# Patient Record
Sex: MALE | Race: ASIAN | NOT HISPANIC OR LATINO | Employment: STUDENT | URBAN - METROPOLITAN AREA
[De-identification: names, ages, dates, MRNs, and addresses within clinical notes are randomized per-mention and may not be internally consistent; named-entity substitution may affect disease eponyms.]

---

## 2017-11-20 ENCOUNTER — OFFICE VISIT (OUTPATIENT)
Dept: ENDOCRINOLOGY | Facility: CLINIC | Age: 22
End: 2017-11-20
Payer: COMMERCIAL

## 2017-11-20 VITALS
HEART RATE: 74 BPM | WEIGHT: 117.19 LBS | BODY MASS INDEX: 15.87 KG/M2 | HEIGHT: 72 IN | SYSTOLIC BLOOD PRESSURE: 104 MMHG | DIASTOLIC BLOOD PRESSURE: 62 MMHG

## 2017-11-20 DIAGNOSIS — E23.0 HYPOGONADISM, HYPOGONADOTROPIC, WITH ANOSMIA: Primary | ICD-10-CM

## 2017-11-20 PROCEDURE — 99999 PR PBB SHADOW E&M-NEW PATIENT-LVL III: CPT | Mod: PBBFAC,,, | Performed by: INTERNAL MEDICINE

## 2017-11-20 PROCEDURE — 99203 OFFICE O/P NEW LOW 30 MIN: CPT | Mod: S$GLB,,, | Performed by: INTERNAL MEDICINE

## 2017-11-20 RX ORDER — TESTOSTERONE CYPIONATE 200 MG/ML
100 INJECTION, SOLUTION INTRAMUSCULAR
Refills: 0
Start: 2017-11-20 | End: 2018-06-15 | Stop reason: SDUPTHER

## 2017-11-20 RX ORDER — TESTOSTERONE CYPIONATE 200 MG/ML
INJECTION, SOLUTION INTRAMUSCULAR
Refills: 0 | COMMUNITY
Start: 2017-09-22 | End: 2017-11-20 | Stop reason: SDUPTHER

## 2017-11-20 NOTE — PROGRESS NOTES
CHIEF COMPLAINT: Kallmans  22 year old being seen as a new patient. Has history Kallmans syndrome. Had been seen at [facility]. 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 Initial w/u in [facility] showed FSH 0.73 IU/L with testosterone 0.17 nmol/L. According to [facility] records had an MRI that showed a normal pituitary. [facility] records state he had a karyotype showing he was 46 XY. On testosterone 150 monthly. Has been on this dose for 2 months. Overall feeling good. 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 No hearing aids. No fractures. Had taken HCG in the past- 2 years ago. He does get AM erections. He states libido is good. Denies ED. Last injection Oct 25. Prefers injections.         PAST MEDICAL HISTORY/PAST SURGICAL HISTORY:  Reviewed in [facility]    SOCIAL HISTORY: Smokes 2-3 cig/day. Social alcohol. Student at [employer].     FAMILY HISTORY:  3 sisters had normal sexual development. Parents had normal health.     MEDICATIONS/ALLERGIES: The patient's MedCard has been updated and reviewed.      ROS:   Constitutional: No recent significant weight change  Eyes: No recent visual changes  ENT: No dysphagia  Cardiovascular: Denies current anginal symptoms  Respiratory: Denies current respiratory difficulty  Gastrointestinal: Denies recent bowel disturbances  GenitoUrinary - No dysuria  Skin: No new skin rash  Neurologic: No focal neurologic complaints  Remainder ROS negative        PE:    GENERAL: Well developed, well nourished.  PSYCH:  appropriate mood and affect  EYES:  PERRL, EOM intact.  ENT: Nares patent, oropharynx clear, mucosa pink,   NECK: Supple, trachea midline, No palpable thyroid nodules  CHEST: Resp even and unlabored, CTA bilateral.  CARDIAC: RRR, S1, S2 heard, no murmurs, rubs, S3, or S4  ABDOMEN: Soft, non-tender, non-distended;  No organomegaly  VASCULAR:  DP pulses +2/4 bilaterally, no edema  NEURO: Gait steady, CN II-VII grossly intact  SKIN: No areas of breakdown, no acanthosis  nigracans.                LABS   [facility] Labs from July scanned in [facility]    7/13/17- MRI Brain. Unremarkable MRI of brain  7/14/17 Scrotal US- R testicle 1.2 x 0.7 x 0.6 cm  L testicle 1.7 x 0.9 x 0.5 cm    7/14/17  Cortisol 1 8.5  Cortisol 2 19.6  Cortisol 3 20.7    ASSESSMENT/PLAN:  1. Kallmans Syndrome- On testosterone therapy. He was getting care at the [facility]. WIll do labs below to assess testosterone level.       FOLLOWUP- [facility]  1 week CMP, Testosterone, CBC, Prolactin, TSH  F/U 6 months Testosterone, CBC, CMP

## 2017-11-27 ENCOUNTER — LAB VISIT (OUTPATIENT)
Dept: LAB | Facility: HOSPITAL | Age: 22
End: 2017-11-27
Attending: INTERNAL MEDICINE
Payer: COMMERCIAL

## 2017-11-27 DIAGNOSIS — E23.0 HYPOGONADISM, HYPOGONADOTROPIC, WITH ANOSMIA: ICD-10-CM

## 2017-11-27 LAB
ALBUMIN SERPL BCP-MCNC: 3.7 G/DL
ALP SERPL-CCNC: 124 U/L
ALT SERPL W/O P-5'-P-CCNC: 15 U/L
ANION GAP SERPL CALC-SCNC: 8 MMOL/L
AST SERPL-CCNC: 16 U/L
BASOPHILS # BLD AUTO: 0.03 K/UL
BASOPHILS NFR BLD: 0.5 %
BILIRUB SERPL-MCNC: 1.6 MG/DL
BUN SERPL-MCNC: 15 MG/DL
CALCIUM SERPL-MCNC: 9.2 MG/DL
CHLORIDE SERPL-SCNC: 108 MMOL/L
CO2 SERPL-SCNC: 27 MMOL/L
CREAT SERPL-MCNC: 0.8 MG/DL
DIFFERENTIAL METHOD: ABNORMAL
EOSINOPHIL # BLD AUTO: 0.1 K/UL
EOSINOPHIL NFR BLD: 1.8 %
ERYTHROCYTE [DISTWIDTH] IN BLOOD BY AUTOMATED COUNT: 13.9 %
EST. GFR  (AFRICAN AMERICAN): >60 ML/MIN/1.73 M^2
EST. GFR  (NON AFRICAN AMERICAN): >60 ML/MIN/1.73 M^2
GLUCOSE SERPL-MCNC: 106 MG/DL
HCT VFR BLD AUTO: 34.9 %
HGB BLD-MCNC: 11.6 G/DL
IMM GRANULOCYTES # BLD AUTO: 0.01 K/UL
IMM GRANULOCYTES NFR BLD AUTO: 0.2 %
LYMPHOCYTES # BLD AUTO: 2.8 K/UL
LYMPHOCYTES NFR BLD: 49.6 %
MCH RBC QN AUTO: 29.3 PG
MCHC RBC AUTO-ENTMCNC: 33.2 G/DL
MCV RBC AUTO: 88 FL
MONOCYTES # BLD AUTO: 0.5 K/UL
MONOCYTES NFR BLD: 8.9 %
NEUTROPHILS # BLD AUTO: 2.2 K/UL
NEUTROPHILS NFR BLD: 39 %
NRBC BLD-RTO: 0 /100 WBC
PLATELET # BLD AUTO: 194 K/UL
PMV BLD AUTO: 10.9 FL
POTASSIUM SERPL-SCNC: 4.5 MMOL/L
PROLACTIN SERPL IA-MCNC: 16.7 NG/ML
PROT SERPL-MCNC: 6.5 G/DL
RBC # BLD AUTO: 3.96 M/UL
SODIUM SERPL-SCNC: 143 MMOL/L
TESTOST SERPL-MCNC: 589 NG/DL
TSH SERPL DL<=0.005 MIU/L-ACNC: 0.87 UIU/ML
WBC # BLD AUTO: 5.63 K/UL

## 2017-11-27 PROCEDURE — 85025 COMPLETE CBC W/AUTO DIFF WBC: CPT

## 2017-11-27 PROCEDURE — 84403 ASSAY OF TOTAL TESTOSTERONE: CPT

## 2017-11-27 PROCEDURE — 80053 COMPREHEN METABOLIC PANEL: CPT

## 2017-11-27 PROCEDURE — 84443 ASSAY THYROID STIM HORMONE: CPT

## 2017-11-27 PROCEDURE — 84146 ASSAY OF PROLACTIN: CPT

## 2017-11-27 PROCEDURE — 36415 COLL VENOUS BLD VENIPUNCTURE: CPT | Mod: PO

## 2018-02-05 ENCOUNTER — PATIENT MESSAGE (OUTPATIENT)
Dept: ENDOCRINOLOGY | Facility: CLINIC | Age: 23
End: 2018-02-05

## 2018-02-05 DIAGNOSIS — E23.0 KALLMAN SYNDROME: Primary | ICD-10-CM

## 2018-02-05 NOTE — TELEPHONE ENCOUNTER
Dr. Tiwari, please see pt's message.  OK to repeat testosterone?  And I'm not sure what he's talking about with the cholesterol.

## 2018-02-09 DIAGNOSIS — E29.1 HYPOGONADISM IN MALE: Primary | ICD-10-CM

## 2018-02-12 ENCOUNTER — LAB VISIT (OUTPATIENT)
Dept: LAB | Facility: HOSPITAL | Age: 23
End: 2018-02-12
Attending: INTERNAL MEDICINE
Payer: COMMERCIAL

## 2018-02-12 DIAGNOSIS — E23.0 KALLMAN SYNDROME: ICD-10-CM

## 2018-02-12 DIAGNOSIS — E29.1 HYPOGONADISM IN MALE: ICD-10-CM

## 2018-02-12 LAB
CHOLEST SERPL-MCNC: 161 MG/DL
CHOLEST/HDLC SERPL: 2.6 {RATIO}
HDLC SERPL-MCNC: 61 MG/DL
HDLC SERPL: 37.9 %
LDLC SERPL CALC-MCNC: 85.4 MG/DL
NONHDLC SERPL-MCNC: 100 MG/DL
TESTOST SERPL-MCNC: 136 NG/DL
TRIGL SERPL-MCNC: 73 MG/DL

## 2018-02-12 PROCEDURE — 80061 LIPID PANEL: CPT

## 2018-02-12 PROCEDURE — 36415 COLL VENOUS BLD VENIPUNCTURE: CPT | Mod: PO

## 2018-02-12 PROCEDURE — 84403 ASSAY OF TOTAL TESTOSTERONE: CPT

## 2018-02-14 ENCOUNTER — PATIENT MESSAGE (OUTPATIENT)
Dept: ENDOCRINOLOGY | Facility: CLINIC | Age: 23
End: 2018-02-14

## 2018-02-14 DIAGNOSIS — E29.1 MALE HYPOGONADISM: Primary | ICD-10-CM

## 2018-02-19 ENCOUNTER — LAB VISIT (OUTPATIENT)
Dept: LAB | Facility: HOSPITAL | Age: 23
End: 2018-02-19
Attending: INTERNAL MEDICINE
Payer: COMMERCIAL

## 2018-02-19 DIAGNOSIS — E29.1 MALE HYPOGONADISM: ICD-10-CM

## 2018-02-19 LAB — TESTOST SERPL-MCNC: 678 NG/DL

## 2018-02-19 PROCEDURE — 84403 ASSAY OF TOTAL TESTOSTERONE: CPT

## 2018-02-19 PROCEDURE — 36415 COLL VENOUS BLD VENIPUNCTURE: CPT | Mod: PO

## 2018-04-13 ENCOUNTER — PATIENT MESSAGE (OUTPATIENT)
Dept: ENDOCRINOLOGY | Facility: CLINIC | Age: 23
End: 2018-04-13

## 2018-06-15 RX ORDER — TESTOSTERONE CYPIONATE 200 MG/ML
100 INJECTION, SOLUTION INTRAMUSCULAR
Qty: 10 ML | Refills: 0 | Status: SHIPPED | OUTPATIENT
Start: 2018-06-15 | End: 2018-06-19 | Stop reason: SDUPTHER

## 2018-06-18 ENCOUNTER — PATIENT MESSAGE (OUTPATIENT)
Dept: ENDOCRINOLOGY | Facility: CLINIC | Age: 23
End: 2018-06-18

## 2018-06-18 ENCOUNTER — TELEPHONE (OUTPATIENT)
Dept: ENDOCRINOLOGY | Facility: CLINIC | Age: 23
End: 2018-06-18

## 2018-06-19 ENCOUNTER — PATIENT MESSAGE (OUTPATIENT)
Dept: ENDOCRINOLOGY | Facility: CLINIC | Age: 23
End: 2018-06-19

## 2018-06-19 ENCOUNTER — TELEPHONE (OUTPATIENT)
Dept: ENDOCRINOLOGY | Facility: CLINIC | Age: 23
End: 2018-06-19

## 2018-06-19 RX ORDER — TESTOSTERONE CYPIONATE 200 MG/ML
100 INJECTION, SOLUTION INTRAMUSCULAR
Qty: 6 ML | Refills: 2 | Status: SHIPPED | OUTPATIENT
Start: 2018-06-19

## 2018-07-30 ENCOUNTER — PATIENT MESSAGE (OUTPATIENT)
Dept: ENDOCRINOLOGY | Facility: CLINIC | Age: 23
End: 2018-07-30

## 2018-08-02 ENCOUNTER — PATIENT MESSAGE (OUTPATIENT)
Dept: ENDOCRINOLOGY | Facility: CLINIC | Age: 23
End: 2018-08-02

## 2021-06-22 NOTE — TELEPHONE ENCOUNTER
----- Message from Silvia Kim sent at 6/18/2018  2:00 PM CDT -----   Type:  Pharmacy Calling to Clarify an RX    Name of Caller:  pt  Pharmacy Name:    [id number] - [place] - [address] [place]  [address] [address]  Phone: [phone] Fax: [phone]    Prescription Name:  desdosderone 10  mg  What do they need to clarify?: na  Best Call Back Number:  [phone]   Addended by: CARLO RIZO on: 6/22/2021 04:08 PM     Modules accepted: Orders